# Patient Record
Sex: MALE | Race: BLACK OR AFRICAN AMERICAN | URBAN - METROPOLITAN AREA
[De-identification: names, ages, dates, MRNs, and addresses within clinical notes are randomized per-mention and may not be internally consistent; named-entity substitution may affect disease eponyms.]

---

## 2020-03-13 ENCOUNTER — VIRTUAL VISIT (OUTPATIENT)
Dept: FAMILY MEDICINE | Facility: OTHER | Age: 35
End: 2020-03-13

## 2020-03-16 NOTE — PROGRESS NOTES
"Date: 2020 17:23:52  Clinician: Juventino Gill  Clinician NPI: [id number]  Patient: rené Mijares  Patient : 1985  Patient Address: [address] [place] [address]  Patient Phone: [phone]  Visit Protocol: URI  Patient Summary:  rené is a 34 year old ( : 1985 ) male who initiated a Visit for cold, sinus infection, or influenza. When asked the question \"Please sign me up to receive news, health information and promotions. \", rené responded \"No\".    rené states his symptoms started gradually 3-6 days ago.   His symptoms consist of rhinitis, malaise, a sore throat, nasal congestion, a headache, and facial pain or pressure. rené also feels feverish but was unable to measure his temperature.   Symptom details     Nasal secretions: The color of his mucus is clear, yellow, and white.    Sore throat: rené reports having mild throat pain (1-3 on a 10 point pain scale), does not have exudate on his tonsils, and can swallow liquids. The lymph nodes in his neck are not enlarged. A rash has not appeared on the skin since the sore throat started.     Facial pain or pressure: The facial pain or pressure feels worse when bending over or leaning forward.     Headache: He states the headache is mild (1-3 on a 10 point pain scale).      rené denies having wheezing, ear pain, cough, teeth pain, enlarged lymph nodes, chills, and myalgias. He also denies double sickening (worsening symptoms after initial improvement), taking antibiotic medication for the symptoms, having recent facial or sinus surgery in the past 60 days, and having a sinus infection within the past year. He is not experiencing dyspnea.   Precipitating events  Within the past week, rené has not been exposed to someone with strep throat.   Pertinent COVID-19 (Coronavirus) information  rené has not traveled internationally in the last 14 days before the start of his symptoms.   rené has not had close contact with a " laboratory confirmed positive COVID-19 patient within 14 days of symptom onset.   Pertinent medical history  rené does not need a return to work/school note.   Weight: 127 lbs   rené does not smoke or use smokeless tobacco.   Weight: 127 lbs    MEDICATIONS: No current medications, ALLERGIES: NKDA  Clinician Response:  Dear rené,  Based on the information provided, you have a viral upper respiratory infection, otherwise known as a cold. Symptoms vary from person to person, but can include sneezing, coughing, a runny nose, sore throat, and headache and range from mild to severe.  Unfortunately, there are no medications that can cure a cold, so treatment is focused on controlling symptoms as much as possible. Most people gradually feel better until symptoms are gone in 1-2 weeks.  Medication information  Because you have a viral infection, antibiotics will not help you get better. Treating a viral infection with antibiotics could actually make you feel worse.  I am prescribing:     Fluticasone 50 mcg/actuation nasal spray. Inhale 2 sprays in each nostril 1 time per day; after 1 week, may adjust to 1 - 2 sprays in each nostril 1 time per day. This medication takes several days to start working, so keep taking it even if it doesn't help right away. There are no refills with this prescription.   Unless you are allergic to the over-the-counter medication(s) below, I recommend using:       Ibuprofen (Advil or store brand) 200 mg oral tablet. Take 1-3 tablets (200-600 mg) by mouth every 8 hours to help with the discomfort. Make sure to take the ibuprofen with food. Do not exceed 2400 mg in 24 hours.    A decongestant such as Sudafed PE or store brand.     Over-the-counter medications do not require a prescription. Ask the pharmacist if you have any questions.  Self care  The following tips will keep you as comfortable as possible while you recover:     Rest    Drink plenty of water and other liquids    Take a hot  shower to loosen congestion    Use throat lozenges    Gargle with warm salt water (1/4 teaspoon of salt per 8 ounce glass of water)    Suck on frozen items such as popsicles or ice cubes    Drink hot tea with lemon and honey     When to seek care  Please be seen in a clinic or urgent care if new symptoms develop, or symptoms become worse.  Call 911 or go to the emergency room if you feel that your throat is closing off, you suddenly develop a rash, you are unable to swallow fluids, you are drooling, or you are having difficulty breathing.   Diagnosis: Viral URI  Diagnosis ICD: J06.9  Prescription: fluticasone 50 mcg/actuation nasal spray,suspension 1 120 spray aerosol with adapter (grams), 30 days supply. Inhale 2 sprays in each nostril 1 time per day; after 1 week, may adjust to 1 - 2 sprays in each nostril 1 time per day.. Refills: 0, Refill as needed: no, Allow substitutions: yes